# Patient Record
(demographics unavailable — no encounter records)

---

## 2025-06-11 NOTE — DISCUSSION/SUMMARY
[de-identified] : All these findings were discussed with the patient.  She reports that she is not emotionally ready for total knee arthroplasty.  She will continue physical therapy.  We discussed that with total knee arthroplasty the deformity will be corrected.  Patient has to think about it and discuss it with her family.  She will follow-up as needed.

## 2025-06-11 NOTE — HISTORY OF PRESENT ILLNESS
[Constant] : ~He/She~ states the symptoms seem to be constant [Sitting] : worsened by sitting [Walking] : worsened by walking [None] : No relieving factors are noted [de-identified] : 's ID Number: [id number] 's Full Name: fany Language: Latvian.  71-year-old lady presents for evaluation of bilateral knee pain, more pronounced on the right side.  She reports constant throbbing and cramping pain which is relatively mild, pain gets worse with sitting, walking and stairs, at that time it is moderate.  Right now she uses a cane.  She had steroid and gel injections with no significant improvement at the time.

## 2025-06-11 NOTE — REASON FOR VISIT
[Initial Consultation] : an initial consultation for [[facility] ] : provided by [facility]   [Interpreters_IDNumber] : [id number] [Interpreters_FullName] : [email or web address] [TWNoteComboBox1] : Slovenian

## 2025-06-11 NOTE — HISTORY OF PRESENT ILLNESS
[Constant] : ~He/She~ states the symptoms seem to be constant [Sitting] : worsened by sitting [Walking] : worsened by walking [None] : No relieving factors are noted [de-identified] : 's ID Number: [id number] 's Full Name: fany Language: Pashto.  71-year-old lady presents for evaluation of bilateral knee pain, more pronounced on the right side.  She reports constant throbbing and cramping pain which is relatively mild, pain gets worse with sitting, walking and stairs, at that time it is moderate.  Right now she uses a cane.  She had steroid and gel injections with no significant improvement at the time.

## 2025-06-11 NOTE — DISCUSSION/SUMMARY
[de-identified] : All these findings were discussed with the patient.  She reports that she is not emotionally ready for total knee arthroplasty.  She will continue physical therapy.  We discussed that with total knee arthroplasty the deformity will be corrected.  Patient has to think about it and discuss it with her family.  She will follow-up as needed.

## 2025-06-11 NOTE — PHYSICAL EXAM
[de-identified] : On physical exam patient walks with severe limp.  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  Range of motion is limited.  There is no significant stability and valgus is only partially correctable.  There is pain with range of motion and crepitus on patellar grind test.  Bilateral hip exam is unremarkable. [de-identified] :  bilateral hip and knee xrays were obtained in office today show: Standing AP pelvis and lateral views of bilateral hips were obtained. Bilateral hip x-ray demonstrates mild arthritic changes. Standing AP, lateral and sunrise views of bilateral knees were obtained. Bilateral knee x-ray demonstrates moderate to severe osteoarthritis.  There is significant bilateral loss of joint space, subchondral sclerosis, osteophyte formation.  All 3 compartments are affected. There is a windswept deformity of bilateral knees with left knee aligned in slight varus and the right knee aligned in 33 degrees of valgus.

## 2025-06-11 NOTE — PHYSICAL EXAM
[de-identified] : On physical exam patient walks with severe limp.  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  Range of motion is limited.  There is no significant stability and valgus is only partially correctable.  There is pain with range of motion and crepitus on patellar grind test.  Bilateral hip exam is unremarkable. [de-identified] :  bilateral hip and knee xrays were obtained in office today show: Standing AP pelvis and lateral views of bilateral hips were obtained. Bilateral hip x-ray demonstrates mild arthritic changes. Standing AP, lateral and sunrise views of bilateral knees were obtained. Bilateral knee x-ray demonstrates moderate to severe osteoarthritis.  There is significant bilateral loss of joint space, subchondral sclerosis, osteophyte formation.  All 3 compartments are affected. There is a windswept deformity of bilateral knees with left knee aligned in slight varus and the right knee aligned in 33 degrees of valgus.

## 2025-06-11 NOTE — REASON FOR VISIT
[Initial Consultation] : an initial consultation for [[facility] ] : provided by [facility]   [Interpreters_IDNumber] : [id number] [Interpreters_FullName] : [email or web address] [TWNoteComboBox1] : Angolan